# Patient Record
Sex: MALE | Race: WHITE
[De-identification: names, ages, dates, MRNs, and addresses within clinical notes are randomized per-mention and may not be internally consistent; named-entity substitution may affect disease eponyms.]

---

## 2018-03-13 ENCOUNTER — HOSPITAL ENCOUNTER (EMERGENCY)
Dept: HOSPITAL 92 - ERS | Age: 83
Discharge: HOME | End: 2018-03-13
Payer: MEDICARE

## 2018-03-13 DIAGNOSIS — I25.10: ICD-10-CM

## 2018-03-13 DIAGNOSIS — H05.232: ICD-10-CM

## 2018-03-13 DIAGNOSIS — S01.81XA: Primary | ICD-10-CM
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
## 2018-03-13 DIAGNOSIS — H11.32: ICD-10-CM

## 2018-03-13 DIAGNOSIS — F03.90: ICD-10-CM

## 2018-03-13 DIAGNOSIS — E11.9: ICD-10-CM

## 2018-03-13 LAB
ALBUMIN SERPL BCG-MCNC: 4.1 G/DL (ref 3.4–4.8)
ALP SERPL-CCNC: 98 U/L (ref 40–150)
ALT SERPL W P-5'-P-CCNC: 17 U/L (ref 8–55)
ANION GAP SERPL CALC-SCNC: 13 MMOL/L (ref 10–20)
APTT PPP: 29.4 SEC (ref 22.9–36.1)
AST SERPL-CCNC: 25 U/L (ref 5–34)
BASOPHILS # BLD AUTO: 0 THOU/UL (ref 0–0.2)
BASOPHILS NFR BLD AUTO: 0.1 % (ref 0–1)
BILIRUB SERPL-MCNC: 0.5 MG/DL (ref 0.2–1.2)
BUN SERPL-MCNC: 16 MG/DL (ref 8.4–25.7)
CALCIUM SERPL-MCNC: 9.7 MG/DL (ref 7.8–10.44)
CHLORIDE SERPL-SCNC: 99 MMOL/L (ref 98–107)
CK MB SERPL-MCNC: 3 NG/ML (ref 0–6.6)
CO2 SERPL-SCNC: 30 MMOL/L (ref 23–31)
CREAT CL PREDICTED SERPL C-G-VRATE: 0 ML/MIN (ref 70–130)
EOSINOPHIL # BLD AUTO: 0.1 THOU/UL (ref 0–0.7)
EOSINOPHIL NFR BLD AUTO: 1.7 % (ref 0–10)
GLOBULIN SER CALC-MCNC: 2.8 G/DL (ref 2.4–3.5)
GLUCOSE SERPL-MCNC: 129 MG/DL (ref 83–110)
HGB BLD-MCNC: 12.6 G/DL (ref 14–18)
INR PPP: 1
LYMPHOCYTES # BLD: 1.1 THOU/UL (ref 1.2–3.4)
LYMPHOCYTES NFR BLD AUTO: 18.1 % (ref 21–51)
MCH RBC QN AUTO: 31 PG (ref 27–31)
MCV RBC AUTO: 91.9 FL (ref 80–94)
MONOCYTES # BLD AUTO: 0.4 THOU/UL (ref 0.11–0.59)
MONOCYTES NFR BLD AUTO: 6.3 % (ref 0–10)
NEUTROPHILS # BLD AUTO: 4.5 THOU/UL (ref 1.4–6.5)
NEUTROPHILS NFR BLD AUTO: 73.8 % (ref 42–75)
PLATELET # BLD AUTO: 177 THOU/UL (ref 130–400)
POTASSIUM SERPL-SCNC: 4.3 MMOL/L (ref 3.5–5.1)
PROTHROMBIN TIME: 13.2 SEC (ref 12–14.7)
RBC # BLD AUTO: 4.06 MILL/UL (ref 4.7–6.1)
SODIUM SERPL-SCNC: 138 MMOL/L (ref 136–145)
TROPONIN I SERPL DL<=0.01 NG/ML-MCNC: 0.01 NG/ML (ref ?–0.03)
WBC # BLD AUTO: 6.1 THOU/UL (ref 4.8–10.8)

## 2018-03-13 PROCEDURE — 72125 CT NECK SPINE W/O DYE: CPT

## 2018-03-13 PROCEDURE — 85730 THROMBOPLASTIN TIME PARTIAL: CPT

## 2018-03-13 PROCEDURE — 70486 CT MAXILLOFACIAL W/O DYE: CPT

## 2018-03-13 PROCEDURE — 90471 IMMUNIZATION ADMIN: CPT

## 2018-03-13 PROCEDURE — 36416 COLLJ CAPILLARY BLOOD SPEC: CPT

## 2018-03-13 PROCEDURE — 82553 CREATINE MB FRACTION: CPT

## 2018-03-13 PROCEDURE — 93005 ELECTROCARDIOGRAM TRACING: CPT

## 2018-03-13 PROCEDURE — 85025 COMPLETE CBC W/AUTO DIFF WBC: CPT

## 2018-03-13 PROCEDURE — 70450 CT HEAD/BRAIN W/O DYE: CPT

## 2018-03-13 PROCEDURE — 80053 COMPREHEN METABOLIC PANEL: CPT

## 2018-03-13 PROCEDURE — 12011 RPR F/E/E/N/L/M 2.5 CM/<: CPT

## 2018-03-13 PROCEDURE — 90715 TDAP VACCINE 7 YRS/> IM: CPT

## 2018-03-13 PROCEDURE — 85610 PROTHROMBIN TIME: CPT

## 2018-03-13 PROCEDURE — 84484 ASSAY OF TROPONIN QUANT: CPT

## 2018-03-13 NOTE — CT
CT FACIAL BONES:

 

Technique: Axial images were obtained with coronal and sagittal reconstruction. 

 

FINDINGS: 

As mentioned on the CT of the brain, an area of hypodensity seen in the left globe which may represen
t intraocular hematoma versus post-surgical changes. 

 

Right frontal scalp and ===== hematoma again seen. 

 

No evidence of facial fractures seen. The paranasal sinuses are well aerated. 

 

IMPRESSION: 

1. No evidence of facial fractures. 

2. Findings concerning for left intraocular hemorrhage and a left periorbital hematoma.

 

POS: [facility]

## 2018-03-13 NOTE — CT
CERVICAL SPINE CT SCAN WITHOUT IV CONTRAST:

 

History: 

82-year-old male with history of fall out of bed this morning with small lacerations over the eyebrow
 and nose region. 

 

FINDINGS: 

Significant multilevel cervical spine disc osteophytosis and significant facet arthrosis with some va
riable severity multilevel canal, lateral recess and foraminal stenosis. No evidence for acute fractu
re or facet dislocation. Bilateral carotid artery vascular calcifications. Very tiny residual thyroid
 tissue, slightly more prominent on the right side. 

 

IMPRESSION: 

Cervical spondylosis without fracture or dislocation.

 

POS: [facility]

## 2018-03-13 NOTE — CT
NONCONTRAST ENHANCED CT BRAIN:

 

History: 

Fall out of bed. Laceration. Evaluate brain. Patient also has extensive orbital trauma. 

 

Date: 3-13-18 

 

Comparison: 12-23-13

 

FINDINGS: 

CT images demonstrate a small right frontal scalp hematoma. 

 

The left orbit along the lateral and inferior aspects demonstrates an area of hyperdensity within the
 left globe. This may represent intraocular hemorrhage versus post-surgical changes. Correlate with s
urgical history. 

 

An area of hyperdensity seen in the left basal ganglia which is stable and unchanged since the previo
us exam from  likely represents an area of left basal ganglion calcification. Areas of small l
acunar infarcts seen in both thalami which are stable and unchanged. 

 

There is diffuse cortical atrophy and deep white matter ischemic changes seen. 

 

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 

 

IMPRESSION: 

1. Area of post-surgical change versus hemorrhage within the left orbit. Findings discussed with Dr. Nieto at 11:09 a.m. on 3-13-18. 

 

Code CR

 

POS: [facility]

## 2018-09-25 ENCOUNTER — HOSPITAL ENCOUNTER (EMERGENCY)
Dept: HOSPITAL 9 - MADERS | Age: 83
Discharge: NURSING, REHAB OR LONG-TERM CARE | End: 2018-09-25
Payer: MEDICARE

## 2018-09-25 DIAGNOSIS — E11.9: ICD-10-CM

## 2018-09-25 DIAGNOSIS — I25.10: ICD-10-CM
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
## 2018-09-25 DIAGNOSIS — Z86.73: ICD-10-CM

## 2018-09-25 DIAGNOSIS — S01.81XA: Primary | ICD-10-CM

## 2018-09-25 DIAGNOSIS — S51.012A: ICD-10-CM

## 2018-09-25 DIAGNOSIS — F03.90: ICD-10-CM

## 2018-09-25 LAB
ALBUMIN SERPL BCG-MCNC: 3.7 G/DL (ref 3.4–4.8)
ALP SERPL-CCNC: 90 U/L (ref 40–150)
ALT SERPL W P-5'-P-CCNC: 16 U/L (ref 8–55)
ANION GAP SERPL CALC-SCNC: 13 MMOL/L (ref 10–20)
AST SERPL-CCNC: 20 U/L (ref 5–34)
BASOPHILS # BLD AUTO: 0 THOU/UL (ref 0–0.2)
BASOPHILS NFR BLD AUTO: 0.6 % (ref 0–1)
BILIRUB SERPL-MCNC: 0.4 MG/DL (ref 0.2–1.2)
BUN SERPL-MCNC: 25 MG/DL (ref 8.4–25.7)
CALCIUM SERPL-MCNC: 9.5 MG/DL (ref 7.8–10.44)
CHLORIDE SERPL-SCNC: 97 MMOL/L (ref 98–107)
CK MB SERPL-MCNC: 2.4 NG/ML (ref 0–6.6)
CK SERPL-CCNC: 69 U/L (ref 30–200)
CO2 SERPL-SCNC: 30 MMOL/L (ref 23–31)
CREAT CL PREDICTED SERPL C-G-VRATE: 0 ML/MIN (ref 70–130)
EOSINOPHIL # BLD AUTO: 0.1 THOU/UL (ref 0–0.7)
EOSINOPHIL NFR BLD AUTO: 1.5 % (ref 0–10)
GLOBULIN SER CALC-MCNC: 3.5 G/DL (ref 2.4–3.5)
GLUCOSE SERPL-MCNC: 121 MG/DL (ref 83–110)
HGB BLD-MCNC: 11 G/DL (ref 14–18)
LYMPHOCYTES # BLD AUTO: 0.9 THOU/UL (ref 1.2–3.4)
LYMPHOCYTES NFR BLD AUTO: 15.9 % (ref 21–51)
MCH RBC QN AUTO: 30.2 PG (ref 27–31)
MCV RBC AUTO: 91.8 FL (ref 78–98)
MONOCYTES # BLD AUTO: 0.6 THOU/UL (ref 0.11–0.59)
MONOCYTES NFR BLD AUTO: 10.5 % (ref 0–10)
NEUTROPHILS # BLD AUTO: 4.1 THOU/UL (ref 1.4–6.5)
NEUTROPHILS NFR BLD AUTO: 71.6 % (ref 42–75)
PLATELET # BLD AUTO: 245 THOU/UL (ref 130–400)
POTASSIUM SERPL-SCNC: 4.7 MMOL/L (ref 3.5–5.1)
RBC # BLD AUTO: 3.65 MILL/UL (ref 4.7–6.1)
SODIUM SERPL-SCNC: 135 MMOL/L (ref 136–145)
TROPONIN I SERPL DL<=0.01 NG/ML-MCNC: 0.02 NG/ML (ref ?–0.03)
WBC # BLD AUTO: 5.7 THOU/UL (ref 4.8–10.8)

## 2018-09-25 PROCEDURE — 72125 CT NECK SPINE W/O DYE: CPT

## 2018-09-25 PROCEDURE — 82553 CREATINE MB FRACTION: CPT

## 2018-09-25 PROCEDURE — 70450 CT HEAD/BRAIN W/O DYE: CPT

## 2018-09-25 PROCEDURE — 80053 COMPREHEN METABOLIC PANEL: CPT

## 2018-09-25 PROCEDURE — 82550 ASSAY OF CK (CPK): CPT

## 2018-09-25 PROCEDURE — 12013 RPR F/E/E/N/L/M 2.6-5.0 CM: CPT

## 2018-09-25 PROCEDURE — 93005 ELECTROCARDIOGRAM TRACING: CPT

## 2018-09-25 PROCEDURE — 85025 COMPLETE CBC W/AUTO DIFF WBC: CPT

## 2018-09-25 PROCEDURE — 84484 ASSAY OF TROPONIN QUANT: CPT

## 2018-09-25 NOTE — CT
CT OF THE BRAIN WITHOUT CONTRAST:

 

Indication: History of fall with head injury. 

 

FINDINGS: 

No definite acute infarct, hemorrhage, or hydrocephalus is present. There is moderate generalized cer
ebral and cerebellar atrophy. The remote infarcts involving the right cerebellar hemisphere, both alla
lami, as well as the right globus pallidus. There is moderate chronic small vessel ischemic change. S
eptum pallidum and third ventricle are midline. Mastoid air cells and paranasal sinuses are clear. Th
e skull is intact. 

 

IMPRESSION: 

No acute intracranial abnormality. 

 

POS: [facility]

## 2018-09-25 NOTE — CT
CERVICAL SPINE CT WITHOUT IV CONTRAST:

 

HISTORY:

An 82-year-old male with a history of a neck injury following a fall at a nursing home, with neck rosalinda
n.

 

FINDINGS:

There is minimal motion artifact.  Extensive multilevel disk osteophytosis and facet arthrosis change
s are noted with some variable severity canal, lateral recess, and foraminal stenosis.  No evidence f
or acute fracture or facet dislocation.  Slight deformity of the C7 spinous process, which appears to
 be old.

 

IMPRESSION:

Cervical spondylosis.  No acute fracture or facet dislocation.

 

POS: [facility]